# Patient Record
Sex: MALE | Race: ASIAN | Employment: FULL TIME | URBAN - NONMETROPOLITAN AREA
[De-identification: names, ages, dates, MRNs, and addresses within clinical notes are randomized per-mention and may not be internally consistent; named-entity substitution may affect disease eponyms.]

---

## 2022-04-18 ENCOUNTER — TELEPHONE (OUTPATIENT)
Dept: PULMONOLOGY | Age: 37
End: 2022-04-18

## 2022-04-18 DIAGNOSIS — R05.9 COUGH IN ADULT: Primary | ICD-10-CM

## 2022-04-18 DIAGNOSIS — R06.09 DYSPNEA ON EXERTION: ICD-10-CM

## 2022-04-18 NOTE — TELEPHONE ENCOUNTER
I called and spoke to the patient and he was fine with coming down here to do the testing since they are booking out so far where he lives. I was able to get him scheduled for a PFT next Tuesday on the 26th and he stated he will get his CXR done this same day.  He will follow up with you on Wednesday the 27th at 9:30am.

## 2022-04-18 NOTE — TELEPHONE ENCOUNTER
Dr. Steven Hinson,   Are you going to put in the orders for CXR and PFT? Also, what day would you like me to add him in for? Thanks!

## 2022-04-18 NOTE — TELEPHONE ENCOUNTER
Please let patient have these tests done. I will see him with in 1 to 2 days depending up on his availability except Thursday.

## 2022-04-18 NOTE — TELEPHONE ENCOUNTER
I called the hospital campus in [place] and another one that was close to where he lives and they are booking out for PFT's until June. Would you like him to just come in with a CXR for now?

## 2022-04-18 NOTE — TELEPHONE ENCOUNTER
Patient is planning on having PFT and Chest XR completed @ [facility]    We looked for an appt as well but didn't see an opening until July. He said that Dr Carson Cain let him know he could be seen within next week or two. Would the office please issue the orders for this patients imaging and testing as well as work him in for an appt.     Orders can be faxed to:  [phone] XR Fax  [phone] Lab Fax    Patient can call:  [phone] to schedule

## 2022-04-19 NOTE — PROGRESS NOTES
[facility]  Pulmonary medicine clinic initial consult note. Patient: Gisella Damon  : 1985     Lung Nodule Screening     []? Qualifies    [x]? Does not qualify   []? Declined    []? Completed      Chief complaint/[place]: Gisella Damon is a 43 y.o. old male came for further evaluation regarding his Bronchial asthma with referral from self. He is having cough: Yes  Duration of cough: for 10 years  More than 8weeks: No  History of post nasal drip: Yes  History suggestive of GERD I.e bad tast in the mouth in the morning or heart burn: No  History suggestive of aspiration/silent aspiration: No  His cough is associated with sputum production: Yes   The sputum color: clear to white. It turns greenish occasionally  Hemoptysis: Hx of streaky blood in the past mixed with sputum. No recent episode of hemoptysis. Diurnal variation: None. Associated with fever: No   Chills & rigors:No  Rrecent sick contacts: No.   History of Atopy: Yes. History of exposure to occupational dust or chemicals:No.     He was ever diagnosed with following pulmonary diseases:  Bronchial asthma:Yes. COPD:No.   Pulmonary fibrosis:No.   Sarcoidosis:No.   Pulmonary embolism: No.   Bronchiectasis:No.     He was never diagnosed with pulmonary tuberculosis in the past. He denies any recent exposure to any patients with tuberculosis. He denies any recent travel to endemic places of tuberculosis. His PPD test is- negative several years back. He is having shortness of breath:No  Current functional capacity on level ground: Distance he can walk on level ground: Unlimited.    He can climb steps: Yes  He gives a history of orthopnea:No  He gives a history of paroxysmal nocturnal dyspnea: Yes     He is having chest pain:No    He is currently using any oxygen supplementation at rest, exercise or during sleep/at night time: No    He ever diagnosed with connective tissue diseases including Systemic lupus Erythematosus, Rheumatoid arthritis or Sarcoidosis etc:NO    He ever diagnosed with COVID-19 infection in the past:No.    Asthma control (Severity) questionnaire:  Asthma symptoms:  > 2 times per week -> Yes   Night time awakenings: > 2 times per month -> No  Use of short acting beta agonist for symptoms control (Other than pre exercise use) :> 2times per week  -> he is currently not using any rescue inhaler. Interference with normal activity  -> none  Lung function: Fev1 >60% Predicted  -> Yes  Number of exacerbations per year: > 2 -> No      Sleep medicine HPI/Evaluation:    Usual time to go to bed during the work/regular day of week: 11:30 PM  Usual time to wake up during the work//regular day of week: 8 AM    Sleep apnea symptoms:  Noticed to have loud snoring:Yes. Noted by his family member- spouse and parents  Witnessed apneas during sleep noticed: No.   History of choking and gasping sensation at night time: No.   History of headaches in the morning:No.   History of dry mouth in the morning: No.   History of palpitations during night time/nocturnal awakenings: No.   History of sweating during night time/nocturnal awakenings: No.      General:  History of head injury in the past: He had a recent fall with head injury. No loss of consciousness. History of seizures: No  Rest less legs syndrome symptoms:NO  History suggestive of periodic limb movements during sleep: NO  History suggestive of hypnagogic hallucinations: NO  History suggestive of hypnopompic hallucinations: NO  History suggestive of sleep talking:NO  History suggestive of sleep walking: Yes.  He had occasional episodes of sleep walking  History suggestive of bruxism: NO   History suggestive of cataplexy: NO  History suggestive of sleep paralysis: NO    History regarding old sleep studies:  Prior history of sleep study: No.  Using CPAP device: No.  Currently using home Oxygen: NO.       Patient considerations:  Is the patient is ambulatory: Yes  Patient is currently using: None of these Wheelchair, [place] or [place]. Para/Quadriplegic: NO  Hearing deficit : NO  Claustrophobic: NO  MDD : NO  Blind: NO  Incontinent: NO  Para/Quadraplegi: NO.   Need transportation to and from Sleep Center:NO    [place] Sleepiness Score:   Sitting and reading:3  Watching TV:3  Sitting inactive in a public place:0  Being a passenger in a motor vehicle for an hour or more:0  Lying down in the afternoon:0  Sitting and talking to someone:0  Sitting quietly after lunch (no alcohol): 2  Stopped for a few minutes in traffic while drivin  Total Score:8       Social History:  Occupation:  He is current working: Yes  Type of profession: He is working as an  in [employer]. Social History     Tobacco Use    Smoking status: Never Smoker    Smokeless tobacco: Never Used   Substance Use Topics    Alcohol use: Yes     Comment: occasional    Drug use: Never       History of recreational or IV drug use in the past:NO  History of Alcohol use: No.       History of exposure to coal mines/coal dust: NO  History of exposure to foundry dust/welding: NO  History of exposure to quarry/silica/sandblasting: NO  History of exposure to asbestos/working with breaks/ships: NO  History of exposure to farm dust: NO  History of recent travel to long distances: NO  History of exposure to birds, pigeons, or chickens in the past:NO  Pet animals at home:No    History of pulmonary embolism in the past: No            History of DVT in the past:No                          Review of Systems:   General/Constitutional: No recent loss of weight or appetite changes. No fever or chills. HENT: Negative. Eyes: Negative. Upper respiratory tract: No nasal stuffiness or post nasal drip. Lower respiratory tract/ lungs: No cough or sputum production. No hemoptysis. Cardiovascular: No palpitations or chest pain.   Gastrointestinal: No nausea or vomiting. Neurological: No focal neurologiacal weakness. Extremities: No edema. Musculoskeletal: No complaints. Genitourinary: No complaints. Hematological: Negative. Psychiatric/Behavioral: Negative. Skin: No itching. 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 No family history on file. Physical Exam:    VITALS:  /62 (Site: Left Upper Arm, Position: Sitting, Cuff Size: Medium Adult)   Pulse 75   Temp 97.8 °F (36.6 °C)   Ht 5' 10\" (1.778 m)   Wt 183 lb 9.6 oz (83.3 kg)   SpO2 98% Comment: room air at rest  BMI 26.34 kg/m²    Neck Circumference -  14  Mallampati - 3    Nursing note and vitals reviewed. Constitutional: Patient appears moderately built and moderately nourished. No distress. Patient is oriented to person, place, and time. HENT: Hypertrophied nasal turbinates with pale nasal mucosa bilaterally. Head: Normocephalic and atraumatic. Right Ear: External ear normal.   Left Ear: External ear normal.   Mouth/Throat: Oropharynx is clear and moist.  No oral thrush. Eyes: Conjunctivae are normal. Pupils are equal, round, and reactive to light. No scleral icterus. Neck: Neck supple. No JVD present. No tracheal deviation present. Cardiovascular: Normal rate, regular rhythm, normal heart sounds. No murmur heard. Pulmonary/Chest: Effort normal and breath sounds normal. No stridor. No respiratory distress. Bilateral occasional expiratory wheezes. No rales. Patient exhibits no tenderness. Abdominal: Soft. Patient exhibits no distension. No tenderness. Musculoskeletal: Normal range of motion.   Extremities: Patient exhibits no edema and no tenderness. Lymphadenopathy:  No cervical adenopathy. Neurological: Patient is alert and oriented to person, place, and time. Skin: Skin is warm and dry. Patient is not diaphoretic. Psychiatric: Patient  has a normal mood and affect. Patient behavior is normal.       Diagnostic Data:    Radiological Data:  Chest x-ray PA and lateral views performed on 26 April 2022:  PROCEDURE: XR CHEST (2 VW)   CLINICAL INFORMATION: Cough in adult, Dyspnea on exertion   COMPARISON: No prior study. Normal chest. No acute findings. 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 He used to be on treatment with Plaquenil. He took a Plaquenil for period of 2 years. He quit taking Plaquenil in September 2021. He is currently on homeopathic treatment. He was seen at [facility] dermatology clinic on 28 September 2020 for the last time by Dr. Angelo Shepard MD  -Alopecia areata over his left cheek- resolved. -Snoring most likely due to uncontrolled allergic rhinitis. Recommendations/Plan:  -Start patient on Flovent HFA 110mcg/INH, 2INH BID. He  was informed about adverse effects of Flovent. He verbalizes understanding.  -At the request of patient, he was given a prescription for a Spacer device to use with his current inhalers. He was demonstrated in my office how to use it.  -Start patient on Albuterol HFA 90mcg/Spray MDI, 2puffs  Q6Hprn. He  was informed about adverse effects of Albuterol HFA.  He verbalizes understanding.  -Continue patient on Flonase 50mcg/INH 2sprays each nostril daily. He  was informed about adverse effects of Flonase. He was demonstrated in my office how to use Flonase. He verbalizes understanding.  -Will send immunoglobulin G panel.  -Will send IgE and mini Rast test.  -Send serum for C-ANCA ( C-Anti Neutrophil cytoplasmic antibody). -Send serum for P-ANCA ( Perinuclear-Anti Neutrophil cytoplasmic antibody). -Will send CBC. -Start patient on Omeprazole 40mg po dialy for 8 weeks for empiric therapy for GERD. -The pathophysiology of reflux is discussed. Anti-reflux measures such as raising the head of the bed, avoiding tight clothing or belts, avoiding eating late at night and not lying down shortly after mealtime and achieving weight loss are discussed. Avoid ASA, NSAID's, caffeine, peppermints, alcohol and tobacco.  - Patient educated to update his pneumococcal vaccine with family physician and take influenza vaccine in coming season with out fail.  -Wendall Phlegm educated about environmental safety precautions he need to practice to prevent exacerbation ofhis Asthma. Wendall Phlegm verbalizes understanding.  - Schedule patient for follow up with my clinic in 12months for clinical re evaluation. Patient advised to make early appointment if needed. - Patient educated about my impression and plan. Patient verbalizes understanding.      -He will be considered for in lab sleep study in future if his excessive daytime sleepiness did not improve after adequate control of bronchial asthma and GERD. -I personally reviewed updated the Past medical hx, Past surgical hx,Social hx, Family hx, Medications, Allergies in the discrete data section of the patient chart along with labs, Pulmonary medicine,Sleep medicine related, Pathological, Microbiological and Radiological investigations.

## 2022-04-26 ENCOUNTER — HOSPITAL ENCOUNTER (OUTPATIENT)
Age: 37
Discharge: HOME | End: 2022-04-26
Payer: COMMERCIAL

## 2022-04-26 ENCOUNTER — HOSPITAL ENCOUNTER (OUTPATIENT)
Dept: PULMONOLOGY | Age: 37
Discharge: HOME | End: 2022-04-26
Payer: COMMERCIAL

## 2022-04-26 ENCOUNTER — HOSPITAL ENCOUNTER (OUTPATIENT)
Dept: GENERAL RADIOLOGY | Age: 37
Discharge: HOME | End: 2022-04-26
Payer: COMMERCIAL

## 2022-04-26 DIAGNOSIS — R05.9 COUGH IN ADULT: ICD-10-CM

## 2022-04-26 DIAGNOSIS — R06.09 DYSPNEA ON EXERTION: ICD-10-CM

## 2022-04-26 PROCEDURE — 94726 PLETHYSMOGRAPHY LUNG VOLUMES: CPT

## 2022-04-26 PROCEDURE — 71046 X-RAY EXAM CHEST 2 VIEWS: CPT

## 2022-04-26 PROCEDURE — 94010 BREATHING CAPACITY TEST: CPT

## 2022-04-26 PROCEDURE — 94729 DIFFUSING CAPACITY: CPT

## 2022-04-26 NOTE — PROGRESS NOTES
Prescreening performed prior to testing. The following symptoms may indicate COVID-19 infection:        One of the following criteria:   Temperature taken, patient temperature was 98.3 F. Fever greater 100.0 F -no  New onset cough -  no  New onset shortness of breath -no  New onset difficulty breathing -no        And/or   Two or more of the following criteria:  New onset muscle aches -no  New onset headache -no  New onset sore throat -no  New onset loss of smell/taste -no  New onset diarrhea -no    Patient's screening was negative. PFT will be performed.

## 2022-04-27 ENCOUNTER — NURSE ONLY (OUTPATIENT)
Dept: LAB | Age: 37
End: 2022-04-27

## 2022-04-27 ENCOUNTER — OFFICE VISIT (OUTPATIENT)
Dept: PULMONOLOGY | Age: 37
End: 2022-04-27
Payer: COMMERCIAL

## 2022-04-27 VITALS
WEIGHT: 183.6 LBS | BODY MASS INDEX: 26.28 KG/M2 | TEMPERATURE: 97.8 F | DIASTOLIC BLOOD PRESSURE: 62 MMHG | HEART RATE: 75 BPM | OXYGEN SATURATION: 98 % | SYSTOLIC BLOOD PRESSURE: 122 MMHG | HEIGHT: 70 IN

## 2022-04-27 DIAGNOSIS — K21.9 GASTROESOPHAGEAL REFLUX DISEASE, UNSPECIFIED WHETHER ESOPHAGITIS PRESENT: ICD-10-CM

## 2022-04-27 DIAGNOSIS — J30.9 ALLERGIC RHINITIS, UNSPECIFIED SEASONALITY, UNSPECIFIED TRIGGER: ICD-10-CM

## 2022-04-27 DIAGNOSIS — J45.30 MILD PERSISTENT ASTHMA WITHOUT COMPLICATION: ICD-10-CM

## 2022-04-27 DIAGNOSIS — R05.9 COUGH IN ADULT: ICD-10-CM

## 2022-04-27 DIAGNOSIS — R05.9 COUGH IN ADULT: Primary | ICD-10-CM

## 2022-04-27 LAB
BASOPHILS # BLD: 0.4 %
BASOPHILS ABSOLUTE: 0 THOU/MM3 (ref 0–0.1)
EOSINOPHIL # BLD: 1.2 %
EOSINOPHILS ABSOLUTE: 0.1 THOU/MM3 (ref 0–0.4)
ERYTHROCYTE [DISTWIDTH] IN BLOOD BY AUTOMATED COUNT: 12.5 % (ref 11.5–14.5)
ERYTHROCYTE [DISTWIDTH] IN BLOOD BY AUTOMATED COUNT: 37.5 FL (ref 35–45)
HCT VFR BLD CALC: 48.5 % (ref 42–52)
HEMOGLOBIN: 16.1 GM/DL (ref 14–18)
IGE: 26 IU/ML
IMMATURE GRANS (ABS): 0.04 THOU/MM3 (ref 0–0.07)
IMMATURE GRANULOCYTES: 0.4 %
LYMPHOCYTES # BLD: 14.2 %
LYMPHOCYTES ABSOLUTE: 1.5 THOU/MM3 (ref 1–4.8)
MCH RBC QN AUTO: 27.7 PG (ref 26–33)
MCHC RBC AUTO-ENTMCNC: 33.2 GM/DL (ref 32.2–35.5)
MCV RBC AUTO: 83.5 FL (ref 80–94)
MONOCYTES # BLD: 6.6 %
MONOCYTES ABSOLUTE: 0.7 THOU/MM3 (ref 0.4–1.3)
NUCLEATED RED BLOOD CELLS: 0 /100 WBC
PLATELET # BLD: 150 THOU/MM3 (ref 130–400)
PMV BLD AUTO: 10.6 FL (ref 9.4–12.4)
RBC # BLD: 5.81 MILL/MM3 (ref 4.7–6.1)
SEG NEUTROPHILS: 77.2 %
SEGMENTED NEUTROPHILS ABSOLUTE COUNT: 8 THOU/MM3 (ref 1.8–7.7)
WBC # BLD: 10.3 THOU/MM3 (ref 4.8–10.8)

## 2022-04-27 PROCEDURE — 99203 OFFICE O/P NEW LOW 30 MIN: CPT | Performed by: INTERNAL MEDICINE

## 2022-04-27 RX ORDER — ALBUTEROL SULFATE 90 UG/1
2 AEROSOL, METERED RESPIRATORY (INHALATION) 4 TIMES DAILY PRN
Qty: 3 EACH | Refills: 3 | Status: SHIPPED | OUTPATIENT
Start: 2022-04-27

## 2022-04-27 RX ORDER — OMEPRAZOLE 40 MG/1
40 CAPSULE, DELAYED RELEASE ORAL DAILY
Qty: 56 CAPSULE | Refills: 1 | Status: SHIPPED | OUTPATIENT
Start: 2022-04-27 | End: 2022-08-22 | Stop reason: SDUPTHER

## 2022-04-27 RX ORDER — FLUTICASONE PROPIONATE 50 MCG
2 SPRAY, SUSPENSION (ML) NASAL DAILY
Qty: 3 EACH | Refills: 11 | Status: SHIPPED | OUTPATIENT
Start: 2022-04-27 | End: 2023-04-27

## 2022-04-27 RX ORDER — FLUTICASONE PROPIONATE 110 UG/1
2 AEROSOL, METERED RESPIRATORY (INHALATION) 2 TIMES DAILY
Qty: 3 EACH | Refills: 3 | Status: SHIPPED | OUTPATIENT
Start: 2022-04-27 | End: 2022-04-29 | Stop reason: SDUPTHER

## 2022-04-27 NOTE — PROGRESS NOTES
Neck Circumference -  14  Mallampati - 3    Lung Nodule Screening     [] Qualifies    [x] Does not qualify   [] Declined    [] Completed

## 2022-04-27 NOTE — PATIENT INSTRUCTIONS
Recommendations/Plan:  -Start patient on Flovent HFA 110mcg/INH, 2INH BID. He  was informed about adverse effects of Flovent. He verbalizes understanding.  -At the request of patient, he was given a prescription for a Spacer device to use with his current inhalers. He was demonstrated in my office how to use it.  -Start patient on Albuterol HFA 90mcg/Spray MDI, 2puffs  Q6Hprn. He  was informed about adverse effects of Albuterol HFA. He verbalizes understanding.  -Continue patient on Flonase 50mcg/INH 2sprays each nostril daily. He  was informed about adverse effects of Flonase. He was demonstrated in my office how to use Flonase. He verbalizes understanding.  -Will send immunoglobulin G panel.  -Will send IgE and mini Rast test.  -Send serum for C-ANCA ( C-Anti Neutrophil cytoplasmic antibody). -Send serum for P-ANCA ( Perinuclear-Anti Neutrophil cytoplasmic antibody). -Will send CBC. -Start patient on Omeprazole 40mg po dialy for 8 weeks for empiric therapy for GERD. -The pathophysiology of reflux is discussed. Anti-reflux measures such as raising the head of the bed, avoiding tight clothing or belts, avoiding eating late at night and not lying down shortly after mealtime and achieving weight loss are discussed. Avoid ASA, NSAID's, caffeine, peppermints, alcohol and tobacco.  - Patient educated to update his pneumococcal vaccine with family physician and take influenza vaccine in coming season with out fail.  -Huong Walton educated about environmental safety precautions he need to practice to prevent exacerbation ofhis Asthma. Huong Walton verbalizes understanding.  - Schedule patient for follow up with my clinic in 12months for clinical re evaluation. Patient advised to make early appointment if needed. - Patient educated about my impression and plan. Patient verbalizes understanding.

## 2022-04-28 ENCOUNTER — TELEPHONE (OUTPATIENT)
Dept: PULMONOLOGY | Age: 37
End: 2022-04-28

## 2022-04-28 NOTE — TELEPHONE ENCOUNTER
Phoned Pharmacy [facility] to see what was going on with medication they stated the Medication Flonase was on hold due to insurance but they did receive the prescription he stated he changed it to 30 day supply instead of 90 due to insurance that took it off hold and it is now ready to be picked up. Phoned pt and Left message to call the office back to notify him on the medication is now ready.

## 2022-04-28 NOTE — TELEPHONE ENCOUNTER
Viola Granado is calling the pharmacy states they dont have his rx for flonase, they have the other 3. Please re send his flonase to the pharmacy.  Thanks

## 2022-04-28 NOTE — TELEPHONE ENCOUNTER
Patient returned call and was given information from [facility] that script was ready for  and was filled for 30 day rather than 90day. Patient voiced understanding and said he would f/u with the pharmacy.

## 2022-04-29 RX ORDER — FLUTICASONE PROPIONATE 110 UG/1
2 AEROSOL, METERED RESPIRATORY (INHALATION) 2 TIMES DAILY
Qty: 3 EACH | Refills: 3 | Status: SHIPPED | OUTPATIENT
Start: 2022-04-29 | End: 2023-04-29

## 2022-04-30 LAB
2000687N OAK TREE IGE: < 0.1 KU/L (ref 0–0.34)
ALLERGEN BERMUDA GRASS IGE: < 0.1 KU/L (ref 0–0.34)
ALLERGEN BIRCH IGE: < 0.1 KU/L (ref 0–0.34)
ALLERGEN DOG DANDER IGE: < 0.1 KU/L (ref 0–0.34)
ALLERGEN GERMAN COCKROACH IGE: < 0.1 KU/L (ref 0–0.34)
ALLERGEN HORMODENDRUM IGE: < 0.1 KUL/L (ref 0–0.34)
ALLERGEN MOUSE EPITHELIA IGE: < 0.1 KU/L (ref 0–0.34)
ALLERGEN PECAN TREE IGE: < 0.1 KU/L (ref 0–0.34)
ALLERGEN PIGWEED ROUGH IGE: < 0.1 KU/L (ref 0–0.34)
ALLERGEN SHEEP SORREL (W18) IGE: < 0.1 KU/L (ref 0–0.34)
ALLERGEN TREE SYCAMORE: < 0.1 KU/L (ref 0–0.34)
ALLERGEN WALNUT TREE IGE: < 0.1 KU/L (ref 0–0.34)
ALLERGEN WHITE MULBERRY TREE, IGE: < 0.1 KU/L (ref 0–0.34)
ALLERGEN, TREE, WHITE ASH IGE: < 0.1 KU/L (ref 0–0.34)
ALTERNARIA ALTERNATA: < 0.1 KU/L (ref 0–0.34)
ASPERGILLUS FUMIGATUS: < 0.1 KU/L (ref 0–0.34)
CAT DANDER ANTIBODY: < 0.1 KU/L (ref 0–0.34)
COTTONWOOD TREE: < 0.1 KU/L (ref 0–0.34)
D. FARINAE: < 0.1 KU/L (ref 0–0.34)
D. PTERONYSSINUS: < 0.1 KU/L (ref 0–0.34)
ELM TREE: < 0.1 KU/L (ref 0–0.34)
IGE: 25 IU/ML
IGG SUBCLASSES: NORMAL
MAPLE/BOXELDER TREE: < 0.1 KU/L (ref 0–0.34)
MOUNTAIN CEDAR TREE: < 0.1 KU/L (ref 0–0.34)
MUCOR RACEMOSUS: < 0.1 KU/L (ref 0–0.34)
P. NOTATUM: < 0.1 KU/L (ref 0–0.34)
RUSSIAN THISTLE: < 0.1 KU/L (ref 0–0.34)
SHORT RAGWD(A ARTEMIS.) IGE: < 0.1 KU/L (ref 0–0.34)
TIMOTHY GRASS: < 0.1 KU/L (ref 0–0.34)

## 2022-05-02 LAB
ANCA IFA PATTERN: NORMAL
ANCA IFA TITER: NORMAL

## 2022-08-20 DIAGNOSIS — R05.9 COUGH IN ADULT: ICD-10-CM

## 2022-08-20 DIAGNOSIS — J30.9 ALLERGIC RHINITIS, UNSPECIFIED SEASONALITY, UNSPECIFIED TRIGGER: ICD-10-CM

## 2022-08-20 DIAGNOSIS — J45.30 MILD PERSISTENT ASTHMA WITHOUT COMPLICATION: ICD-10-CM

## 2022-08-20 DIAGNOSIS — K21.9 GASTROESOPHAGEAL REFLUX DISEASE, UNSPECIFIED WHETHER ESOPHAGITIS PRESENT: ICD-10-CM

## 2022-08-22 DIAGNOSIS — R05.9 COUGH IN ADULT: ICD-10-CM

## 2022-08-22 DIAGNOSIS — K21.9 GASTROESOPHAGEAL REFLUX DISEASE, UNSPECIFIED WHETHER ESOPHAGITIS PRESENT: ICD-10-CM

## 2022-08-22 DIAGNOSIS — J30.9 ALLERGIC RHINITIS, UNSPECIFIED SEASONALITY, UNSPECIFIED TRIGGER: ICD-10-CM

## 2022-08-22 DIAGNOSIS — J45.30 MILD PERSISTENT ASTHMA WITHOUT COMPLICATION: ICD-10-CM

## 2022-08-22 RX ORDER — OMEPRAZOLE 40 MG/1
40 CAPSULE, DELAYED RELEASE ORAL DAILY
Qty: 30 CAPSULE | Refills: 3 | OUTPATIENT
Start: 2022-08-22 | End: 2022-10-17

## 2022-08-22 RX ORDER — OMEPRAZOLE 40 MG/1
40 CAPSULE, DELAYED RELEASE ORAL DAILY
Qty: 30 CAPSULE | Refills: 2 | Status: SHIPPED | OUTPATIENT
Start: 2022-08-22 | End: 2022-11-20

## 2022-08-22 NOTE — TELEPHONE ENCOUNTER
Received refill request for Prilosec. Medication was last ordered by Dr. Khanh De La Cruz. Medication was last ordered on 4/27/22 with 1 refills. Patient was last seen in the office 4/27/22. Patient has a scheduled follow up 4/24/22. Medication needs to be sent to [facility]  Pharmacy.

## 2022-09-17 DIAGNOSIS — R05.9 COUGH IN ADULT: ICD-10-CM

## 2022-09-17 DIAGNOSIS — J30.9 ALLERGIC RHINITIS, UNSPECIFIED SEASONALITY, UNSPECIFIED TRIGGER: ICD-10-CM

## 2022-09-17 DIAGNOSIS — K21.9 GASTROESOPHAGEAL REFLUX DISEASE, UNSPECIFIED WHETHER ESOPHAGITIS PRESENT: ICD-10-CM

## 2022-09-17 DIAGNOSIS — J45.30 MILD PERSISTENT ASTHMA WITHOUT COMPLICATION: ICD-10-CM

## 2022-09-19 NOTE — TELEPHONE ENCOUNTER
Received refill request for Omeprazole. Medication was last ordered by Flavia. Medication was last ordered on 8/22/22 with 2 refills. Patient was last seen in the office 4/27/22 with Flavia. Patient has a scheduled follow up 4/24/23.    Medication needs to be sent to [facility]/pharmacy #[id number] [place], [place].

## 2022-10-03 RX ORDER — OMEPRAZOLE 40 MG/1
40 CAPSULE, DELAYED RELEASE ORAL DAILY
Qty: 30 CAPSULE | Refills: 3 | OUTPATIENT
Start: 2022-10-03 | End: 2023-01-01

## 2022-11-03 DIAGNOSIS — J45.21 MILD INTERMITTENT ASTHMA WITH EXACERBATION: Primary | ICD-10-CM

## 2022-11-03 RX ORDER — PREDNISONE 10 MG/1
10 TABLET ORAL DAILY
Qty: 18 TABLET | Refills: 0 | OUTPATIENT
Start: 2022-11-03 | End: 2022-11-12

## 2022-11-03 RX ORDER — AZITHROMYCIN 250 MG/1
TABLET, FILM COATED ORAL
Qty: 1 PACKET | Refills: 0 | Status: SHIPPED | OUTPATIENT
Start: 2022-11-03 | End: 2022-11-13

## 2022-11-14 DIAGNOSIS — K21.9 GASTROESOPHAGEAL REFLUX DISEASE, UNSPECIFIED WHETHER ESOPHAGITIS PRESENT: ICD-10-CM

## 2022-11-14 DIAGNOSIS — R05.9 COUGH IN ADULT: Primary | ICD-10-CM

## 2022-11-25 ENCOUNTER — TELEPHONE (OUTPATIENT)
Dept: PULMONOLOGY | Age: 37
End: 2022-11-25

## 2022-11-25 DIAGNOSIS — J30.9 ALLERGIC RHINITIS, UNSPECIFIED SEASONALITY, UNSPECIFIED TRIGGER: ICD-10-CM

## 2022-11-25 DIAGNOSIS — J45.30 MILD PERSISTENT ASTHMA WITHOUT COMPLICATION: ICD-10-CM

## 2022-11-25 DIAGNOSIS — R05.9 COUGH IN ADULT: ICD-10-CM

## 2022-11-25 DIAGNOSIS — J20.8 ACUTE BRONCHITIS DUE TO OTHER SPECIFIED ORGANISMS: Primary | ICD-10-CM

## 2022-11-25 DIAGNOSIS — K21.9 GASTROESOPHAGEAL REFLUX DISEASE, UNSPECIFIED WHETHER ESOPHAGITIS PRESENT: ICD-10-CM

## 2022-11-25 RX ORDER — OMEPRAZOLE 40 MG/1
40 CAPSULE, DELAYED RELEASE ORAL DAILY
Qty: 30 CAPSULE | Refills: 2 | Status: SHIPPED | OUTPATIENT
Start: 2022-11-25 | End: 2023-02-23

## 2022-11-25 RX ORDER — DOXYCYCLINE HYCLATE 100 MG/1
100 CAPSULE ORAL 2 TIMES DAILY
Qty: 14 CAPSULE | Refills: 0 | Status: SHIPPED | OUTPATIENT
Start: 2022-11-25 | End: 2022-12-02

## 2022-11-25 NOTE — TELEPHONE ENCOUNTER
I was called by patient's wife Ms. Silver Medeiros and informed me that patient Mr. Ceferino Pelayo usually suffering with cough with sputum production and worsening of shortness of breath for the last 1 week with no significant improvement. I also spoke with the patient Mr. Ceferino Pelayo over phone. He told me that he is having uncontrollable cough with sputum production. No fever no chills. He was exposed to his little son of 3 years, who is also sick with upper respiratory infection. Patient denies any chest tightness or wheezing. Plan:  I sent an E- prescription for Doxycline 100 mg p.o. twice daily for 7 days with no refills. He was also given refills on omeprazole 40 mg daily for 1 month with 2 refills  He is currently waiting to have outpatient GI consultation to further evaluate his ? GERD as an etiology for his frequent episodes of cough. He was advised to use Anti-reflux measures such as raising the head of the bed, avoiding tight clothing or belts, avoiding eating late at night and not lying down shortly after mealtime and achieving weight loss are discussed. Avoid ASA, NSAID's, caffeine, peppermints, alcohol and tobacco.  Ceferino Pelayo educated about my impression and plan. He verbalizes understanding.

## 2023-11-21 DIAGNOSIS — J30.9 ALLERGIC RHINITIS, UNSPECIFIED SEASONALITY, UNSPECIFIED TRIGGER: ICD-10-CM

## 2023-11-21 DIAGNOSIS — R05.9 COUGH IN ADULT: ICD-10-CM

## 2023-11-21 DIAGNOSIS — J20.8 ACUTE BRONCHITIS DUE TO OTHER SPECIFIED ORGANISMS: ICD-10-CM

## 2023-11-21 DIAGNOSIS — J45.30 MILD PERSISTENT ASTHMA WITHOUT COMPLICATION: ICD-10-CM

## 2023-11-21 DIAGNOSIS — K21.9 GASTROESOPHAGEAL REFLUX DISEASE, UNSPECIFIED WHETHER ESOPHAGITIS PRESENT: ICD-10-CM

## 2023-11-21 RX ORDER — ALBUTEROL SULFATE 90 UG/1
2 AEROSOL, METERED RESPIRATORY (INHALATION) 4 TIMES DAILY PRN
Qty: 1 EACH | Refills: 11 | Status: SHIPPED | OUTPATIENT
Start: 2023-11-21

## 2023-11-21 RX ORDER — OMEPRAZOLE 40 MG/1
40 CAPSULE, DELAYED RELEASE ORAL DAILY
Qty: 30 CAPSULE | Refills: 2 | Status: SHIPPED | OUTPATIENT
Start: 2023-11-21 | End: 2024-02-19

## 2023-11-21 RX ORDER — PREDNISONE 10 MG/1
TABLET ORAL
Qty: 30 TABLET | Refills: 0 | Status: SHIPPED | OUTPATIENT
Start: 2023-11-21 | End: 2023-12-01

## 2023-11-21 RX ORDER — AZITHROMYCIN 250 MG/1
TABLET, FILM COATED ORAL
Qty: 1 PACKET | Refills: 0 | Status: SHIPPED | OUTPATIENT
Start: 2023-11-21 | End: 2023-12-01

## 2023-12-14 DIAGNOSIS — K21.9 GASTROESOPHAGEAL REFLUX DISEASE, UNSPECIFIED WHETHER ESOPHAGITIS PRESENT: ICD-10-CM

## 2023-12-14 DIAGNOSIS — R05.9 COUGH IN ADULT: Primary | ICD-10-CM

## 2023-12-14 RX ORDER — ESOMEPRAZOLE MAGNESIUM 40 MG/1
40 CAPSULE, DELAYED RELEASE ORAL
Qty: 30 CAPSULE | Refills: 2 | Status: SHIPPED | OUTPATIENT
Start: 2023-12-14 | End: 2024-03-13

## 2023-12-14 RX ORDER — BENZONATATE 100 MG/1
200 CAPSULE ORAL 3 TIMES DAILY PRN
Qty: 90 CAPSULE | Refills: 2 | Status: SHIPPED | OUTPATIENT
Start: 2023-12-14 | End: 2024-03-13

## 2023-12-15 DIAGNOSIS — K21.9 GASTROESOPHAGEAL REFLUX DISEASE, UNSPECIFIED WHETHER ESOPHAGITIS PRESENT: Primary | ICD-10-CM
